# Patient Record
Sex: FEMALE
[De-identification: names, ages, dates, MRNs, and addresses within clinical notes are randomized per-mention and may not be internally consistent; named-entity substitution may affect disease eponyms.]

---

## 2022-08-26 ENCOUNTER — NURSE TRIAGE (OUTPATIENT)
Dept: OTHER | Facility: CLINIC | Age: 37
End: 2022-08-26

## 2022-08-26 NOTE — TELEPHONE ENCOUNTER
Subjective: Caller states \"I started feeling sick last Friday with a sore throat\"     Current Symptoms: Child was diagnosed with Influenza B x6 days ago. Sore throat, persistent cough, lymph nodes are swollen. Runny nose. No SOB. No trouble swallowing. Negative for COVID. Head congestion. Ear pain. Onset: 1 week ago; worsening    Associated Symptoms: reduced activity, reduced appetite    Pain Severity: 6/10; burning; constant, moderate    Temperature: denies fever     What has been tried: Tylenol and Ibuprofen-not helping. LMP: NA Pregnant: No    Recommended disposition: See in Office Today    Care advice provided, patient verbalizes understanding; denies any other questions or concerns; instructed to call back for any new or worsening symptoms. Patient/caller warm transferred to Katina Juarez at [facility] for appointment scheduling    This triage is a result of a call to [address] [place] [address]. Please do not respond to the triage nurse through this encounter. Any subsequent communication should be directly with the patient. Reason for Disposition   Influenza suspected (i.e., fever and respiratory symptoms; probable influenza exposure)   [place]    Protocols used:  Influenza Exposure-ADULT-[place], Influenza - Seasonal-ADULT-[place]